# Patient Record
Sex: MALE | Race: BLACK OR AFRICAN AMERICAN | NOT HISPANIC OR LATINO | Employment: FULL TIME | URBAN - METROPOLITAN AREA
[De-identification: names, ages, dates, MRNs, and addresses within clinical notes are randomized per-mention and may not be internally consistent; named-entity substitution may affect disease eponyms.]

---

## 2017-10-23 ENCOUNTER — OFFICE VISIT - HEALTHEAST (OUTPATIENT)
Dept: FAMILY MEDICINE | Facility: CLINIC | Age: 32
End: 2017-10-23

## 2017-10-23 DIAGNOSIS — H92.02 EAR PAIN, LEFT: ICD-10-CM

## 2021-05-31 VITALS — WEIGHT: 160.13 LBS

## 2021-06-08 ENCOUNTER — AMBULATORY - HEALTHEAST (OUTPATIENT)
Dept: NURSING | Facility: CLINIC | Age: 36
End: 2021-06-08

## 2021-06-13 NOTE — PROGRESS NOTES
Assessment:     1. Ear pain, left            Plan:     As to the etiology of his left ear discomfort.  No abnormality that I can appreciate on exam.  Recommend continuing to monitor symptoms.  May try some Tylenol or ibuprofen as needed for discomfort.  Follow-up if symptoms are getting worse or not improving.    Subjective:       32 y.o. male presents for evaluation of left ear discomfort off and on for the past 2 weeks.  He used some eardrops in his left ear which did help a little.  He has not had a fever.  He denies any sore throat, nasal congestion.  It does not feel full or plugged.  He has no hearing changes.  No tinnitus or vertigo.  Currently it is not really bothering him wants to get this checked out.    The following portions of the patient's history were reviewed and updated as appropriate: allergies, current medications, past family history, past medical history, past social history, past surgical history and problem list.    Review of Systems  A 12 point comprehensive review of systems was negative except as noted.     Objective:        Vitals:    10/23/17 1919   BP: 114/86   Pulse: 75   Resp: 16   Temp: 98.4  F (36.9  C)   SpO2: 100%     General Appearance:    Alert, pleasant, cooperative, no distress, appears stated age   Head:    Normocephalic, without obvious abnormality, atraumatic   Eyes:    Conjunctiva/corneas clear   Ears:    Normal TM's without erythema or bulging. [place] external ear canals, both ears   Nose:   Nares normal, septum midline, mucosa normal, no drainage    or sinus tenderness   Throat:   Lips, mucosa, and tongue normal; teeth and gums normal.  No tonsilar hypertrophy or exudate.   Neck:    Cardiovascular:   Supple, symmetrical, trachea midline, no adenopathy;     thyroid:  no enlargement/tenderness/nodules  Regular rate and rhythm, no murmurs, rubs, or gallops.   Lungs:     Clear to auscultation bilaterally without wheezes, rales, or rhonchi, respirations unlabored                           This note has been dictated using voice recognition software. Any grammatical or context distortions are unintentional and inherent to the software

## 2021-06-29 ENCOUNTER — AMBULATORY - HEALTHEAST (OUTPATIENT)
Dept: NURSING | Facility: CLINIC | Age: 36
End: 2021-06-29

## 2021-07-29 ENCOUNTER — LAB REQUISITION (OUTPATIENT)
Dept: LAB | Facility: CLINIC | Age: 36
End: 2021-07-29

## 2021-07-29 PROCEDURE — 86481 TB AG RESPONSE T-CELL SUSP: CPT | Performed by: INTERNAL MEDICINE

## 2021-07-29 PROCEDURE — 36415 COLL VENOUS BLD VENIPUNCTURE: CPT | Performed by: INTERNAL MEDICINE

## 2021-08-02 LAB
GAMMA INTERFERON BACKGROUND BLD IA-ACNC: 0 IU/ML
M TB IFN-G BLD-IMP: NEGATIVE
M TB IFN-G CD4+ BCKGRND COR BLD-ACNC: 10 IU/ML
MITOGEN IGNF BCKGRD COR BLD-ACNC: 0 IU/ML
MITOGEN IGNF BCKGRD COR BLD-ACNC: 0.01 IU/ML
QUANTIFERON MITOGEN: 10 IU/ML
QUANTIFERON NIL TUBE: 0 IU/ML
QUANTIFERON TB1 TUBE: 0 IU/ML
QUANTIFERON TB2 TUBE: 0.01

## 2021-08-12 ENCOUNTER — APPOINTMENT (OUTPATIENT)
Dept: FAMILY MEDICINE | Facility: CLINIC | Age: 36
End: 2021-08-12
Payer: COMMERCIAL

## 2021-08-12 ENCOUNTER — LAB REQUISITION (OUTPATIENT)
Dept: LAB | Facility: CLINIC | Age: 36
End: 2021-08-12

## 2021-08-12 PROCEDURE — U0005 INFEC AGEN DETEC AMPLI PROBE: HCPCS | Performed by: INTERNAL MEDICINE

## 2021-08-13 LAB — SARS-COV-2 RNA RESP QL NAA+PROBE: NEGATIVE

## 2021-08-21 ENCOUNTER — HEALTH MAINTENANCE LETTER (OUTPATIENT)
Age: 36
End: 2021-08-21

## 2021-10-16 ENCOUNTER — HEALTH MAINTENANCE LETTER (OUTPATIENT)
Age: 36
End: 2021-10-16

## 2022-09-25 ENCOUNTER — HEALTH MAINTENANCE LETTER (OUTPATIENT)
Age: 37
End: 2022-09-25

## 2023-10-14 ENCOUNTER — HEALTH MAINTENANCE LETTER (OUTPATIENT)
Age: 38
End: 2023-10-14